# Patient Record
Sex: FEMALE | Race: WHITE | NOT HISPANIC OR LATINO | Employment: FULL TIME | URBAN - METROPOLITAN AREA
[De-identification: names, ages, dates, MRNs, and addresses within clinical notes are randomized per-mention and may not be internally consistent; named-entity substitution may affect disease eponyms.]

---

## 2023-09-04 PROBLEM — R39.15 URINARY URGENCY: Status: ACTIVE | Noted: 2023-09-04

## 2023-09-04 PROBLEM — N39.3 STRESS INCONTINENCE OF URINE: Status: ACTIVE | Noted: 2023-09-04

## 2023-09-04 PROBLEM — M67.88 ACHILLES TENDINOSIS OF LEFT LOWER EXTREMITY: Status: ACTIVE | Noted: 2023-09-04

## 2023-09-04 PROBLEM — M79.673 FOOT PAIN: Status: ACTIVE | Noted: 2023-09-04

## 2023-09-04 PROBLEM — N39.0 CHRONIC UTI: Status: ACTIVE | Noted: 2023-09-04

## 2023-09-04 PROBLEM — S60.559A FOREIGN BODY IN HAND: Status: ACTIVE | Noted: 2023-09-04

## 2023-09-04 PROBLEM — R10.2 PELVIC PAIN IN FEMALE: Status: ACTIVE | Noted: 2023-09-04

## 2023-09-04 PROBLEM — G89.29 CHRONIC LEFT SHOULDER PAIN: Status: ACTIVE | Noted: 2023-09-04

## 2023-09-04 PROBLEM — R30.0 DYSURIA: Status: ACTIVE | Noted: 2023-09-04

## 2023-09-04 PROBLEM — M21.42 ACQUIRED PES PLANUS OF BOTH FEET: Status: ACTIVE | Noted: 2023-09-04

## 2023-09-04 PROBLEM — M21.41 ACQUIRED PES PLANUS OF BOTH FEET: Status: ACTIVE | Noted: 2023-09-04

## 2023-09-04 PROBLEM — A60.00 HERPES, GENITAL: Status: ACTIVE | Noted: 2023-09-04

## 2023-09-04 PROBLEM — B37.9 YEAST INFECTION: Status: ACTIVE | Noted: 2023-09-04

## 2023-09-04 PROBLEM — N91.2 AMENORRHEA: Status: ACTIVE | Noted: 2023-09-04

## 2023-09-04 PROBLEM — N89.8 VAGINAL ITCHING: Status: ACTIVE | Noted: 2023-09-04

## 2023-09-04 PROBLEM — B37.31 VAGINAL CANDIDIASIS: Status: ACTIVE | Noted: 2023-09-04

## 2023-09-04 PROBLEM — M75.00 FROZEN SHOULDER: Status: ACTIVE | Noted: 2023-09-04

## 2023-09-04 PROBLEM — M25.512 CHRONIC LEFT SHOULDER PAIN: Status: ACTIVE | Noted: 2023-09-04

## 2023-09-04 PROBLEM — R63.5 WEIGHT GAIN: Status: ACTIVE | Noted: 2023-09-04

## 2023-09-04 PROBLEM — M79.672 INTRACTABLE LEFT HEEL PAIN: Status: ACTIVE | Noted: 2023-09-04

## 2023-09-04 RX ORDER — NAPROXEN 500 MG/1
TABLET ORAL 2 TIMES DAILY PRN
COMMUNITY
Start: 2022-06-26 | End: 2024-04-09 | Stop reason: WASHOUT

## 2023-09-04 RX ORDER — LIDOCAINE 560 MG/1
PATCH PERCUTANEOUS; TOPICAL; TRANSDERMAL
COMMUNITY
Start: 2022-06-26 | End: 2024-04-09 | Stop reason: WASHOUT

## 2023-09-04 RX ORDER — FAMOTIDINE 20 MG/1
TABLET, FILM COATED ORAL 2 TIMES DAILY
COMMUNITY
Start: 2022-06-26 | End: 2024-04-09 | Stop reason: WASHOUT

## 2023-09-04 RX ORDER — MEDROXYPROGESTERONE ACETATE 10 MG/1
TABLET ORAL
COMMUNITY
Start: 2022-09-09 | End: 2024-04-09 | Stop reason: WASHOUT

## 2023-10-10 ENCOUNTER — APPOINTMENT (OUTPATIENT)
Dept: CARDIOLOGY | Facility: CLINIC | Age: 43
End: 2023-10-10

## 2023-10-12 ENCOUNTER — OFFICE VISIT (OUTPATIENT)
Dept: CARDIOLOGY | Facility: CLINIC | Age: 43
End: 2023-10-12
Payer: COMMERCIAL

## 2023-10-12 VITALS
SYSTOLIC BLOOD PRESSURE: 121 MMHG | HEART RATE: 85 BPM | DIASTOLIC BLOOD PRESSURE: 81 MMHG | BODY MASS INDEX: 38.15 KG/M2 | WEIGHT: 229 LBS | OXYGEN SATURATION: 98 % | HEIGHT: 65 IN

## 2023-10-12 DIAGNOSIS — M79.602 PAIN IN BOTH UPPER EXTREMITIES: Primary | ICD-10-CM

## 2023-10-12 DIAGNOSIS — M79.601 PAIN IN BOTH UPPER EXTREMITIES: Primary | ICD-10-CM

## 2023-10-12 DIAGNOSIS — R29.898 ARM FATIGUE: ICD-10-CM

## 2023-10-12 PROCEDURE — 99214 OFFICE O/P EST MOD 30 MIN: CPT | Performed by: INTERNAL MEDICINE

## 2023-10-12 PROCEDURE — 1036F TOBACCO NON-USER: CPT | Performed by: INTERNAL MEDICINE

## 2023-10-12 ASSESSMENT — ENCOUNTER SYMPTOMS
ENDOCRINE NEGATIVE: 1
LOSS OF SENSATION IN FEET: 0
GASTROINTESTINAL NEGATIVE: 1
CONSTITUTIONAL NEGATIVE: 1
ALLERGIC/IMMUNOLOGIC NEGATIVE: 1
RESPIRATORY NEGATIVE: 1
PSYCHIATRIC NEGATIVE: 1
MUSCULOSKELETAL NEGATIVE: 1
OCCASIONAL FEELINGS OF UNSTEADINESS: 0
NEUROLOGICAL NEGATIVE: 1
HEMATOLOGIC/LYMPHATIC NEGATIVE: 1
DEPRESSION: 0
CARDIOVASCULAR NEGATIVE: 1
EYES NEGATIVE: 1

## 2023-10-12 ASSESSMENT — PAIN SCALES - GENERAL: PAINLEVEL: 0-NO PAIN

## 2023-10-12 ASSESSMENT — PATIENT HEALTH QUESTIONNAIRE - PHQ9
1. LITTLE INTEREST OR PLEASURE IN DOING THINGS: NOT AT ALL
2. FEELING DOWN, DEPRESSED OR HOPELESS: NOT AT ALL
SUM OF ALL RESPONSES TO PHQ9 QUESTIONS 1 AND 2: 0

## 2023-10-12 ASSESSMENT — COLUMBIA-SUICIDE SEVERITY RATING SCALE - C-SSRS
1. IN THE PAST MONTH, HAVE YOU WISHED YOU WERE DEAD OR WISHED YOU COULD GO TO SLEEP AND NOT WAKE UP?: NO
2. HAVE YOU ACTUALLY HAD ANY THOUGHTS OF KILLING YOURSELF?: NO
6. HAVE YOU EVER DONE ANYTHING, STARTED TO DO ANYTHING, OR PREPARED TO DO ANYTHING TO END YOUR LIFE?: NO

## 2023-10-12 NOTE — PROGRESS NOTES
Preventive Cardiology Clinic Note    Reason for Visit: Arm heaviness and arm pain  Referring Clinician: No ref. provider found     History of Present Illness: Jeanie Mustafa is a 43 y.o. female who presents for arm heaviness and arm pain.  Over the past several weeks she has noted fatigue in her arms and heaviness associated with a tingling in her hands and heaviness and pain in her upper back when lifting her arms to fold laundry or moving her arms above her head or in a side to side motion when walking.  She does not have any chest pain, shortness of breath, palpitations, or syncope.  Her arms are not heavy or fatigued when she is walking with them at her side but rather when she is swinging them high near her chest.  She is a non-smoker.  She does not have a family history of premature CAD.  She did go to the emergency department in June with palpitations and high-sensitivity troponin testing and BNP testing were normal.  She also had a normal ECG at that time.  She does not have any symptoms when going upstairs or doing other exertional activities only when she is lifting her arms above her head for a prolonged period of time or moving them vigorously.    Past Medical History:  History of lymphedema and venous insufficiency    Past Surgical History:  History of saphenous vein ablation    Social History:  She reports that she has never smoked. She has never used smokeless tobacco. She reports that she does not drink alcohol and does not use drugs.    Family History:  Family History   Problem Relation Name Age of Onset    Hypothyroidism Mother      Other (heart palpitations) Mother      Diabetes Father      Hyperlipidemia Father      Hypertension Father         Allergies:  Patient has no known allergies.    Outpatient Medications:  Current Outpatient Medications   Medication Instructions    famotidine (Pepcid) 20 mg tablet oral, 2 times daily    lidocaine 4 % patch Topical    medroxyPROGESTERone (Provera) 10 mg  "tablet oral    naproxen (Naprosyn) 500 mg tablet oral, 2 times daily PRN       Review of Systems:  Review of Systems   Constitutional: Negative.   HENT: Negative.     Eyes: Negative.    Cardiovascular: Negative.    Respiratory: Negative.     Endocrine: Negative.    Hematologic/Lymphatic: Negative.    Skin: Negative.    Musculoskeletal: Negative.    Gastrointestinal: Negative.    Genitourinary: Negative.    Neurological: Negative.    Psychiatric/Behavioral: Negative.     Allergic/Immunologic: Negative.        Last Recorded Vitals:  Vitals:    10/12/23 1311   BP: 121/81   BP Location: Right arm   Patient Position: Sitting   BP Cuff Size: Large adult   Pulse: 85   SpO2: 98%   Weight: 104 kg (229 lb)   Height: 1.651 m (5' 5\")       Physical Examination:  General: Well appearing, well-nourished, in no acute distress.  HEENT: Normocephalic atraumatic, pupils equal and reactive to light, extraocular muscles intact, no conjunctival injection, oropharynx clear without exudates.  Neck: Normal carotid arterial pulses, no arterial bruits, no thyromegaly.  Cardiac: Regular rhythm and normal heart rate.  S1, S2 present and normal.  No murmurs, rubs, or gallops.  PMI is nondisplaced. Jugular venous pulsations are normal.  Pulmonary: Normal breath sounds, no increased work of breathing, no wheezes or crackles.  GI: Normal bowel sounds, abdominal aorta not enlarged, no hepatosplenomegaly, no abdominal bruits.  Lower extremities: No cyanosis, clubbing, or edema.  No xanthelasma present. Normal distal pulses.  Skin: Skin intact. No significant rashes or lesions present.  Neuro: Alert and oriented x 3, normal attention and cognition, no focal motor or sensory neurologic deficits.  Psych: Normal affect and mood.  Musculoskeletal: Normal gait normal muscle tone.  Pain in the arms/upper chest is reproducible to palpation and rotation of the shoulders.    Laboratory Studies:  Lab Results   Component Value Date    GLUCOSE 102 (H) 06/26/2022 " "   CALCIUM 9.4 06/26/2022     06/26/2022    K 3.6 06/26/2022    CO2 27 06/26/2022     06/26/2022    BUN 10 06/26/2022    CREATININE 0.74 06/26/2022     Lab Results   Component Value Date    ALT 10 06/26/2022    AST 14 06/26/2022    ALKPHOS 71 06/26/2022    BILITOT 0.8 06/26/2022     Lab Results   Component Value Date    CHOL 182 01/12/2022     Lab Results   Component Value Date    HDL 79.0 01/12/2022     No results found for: \"LDLCALC\"  Lab Results   Component Value Date    TRIG 42 01/12/2022     No components found for: \"CHOLHDL\"  Lab Results   Component Value Date    HGBA1C 5.0 02/20/2018     BNP 6/20/2022 is normal at 8    High-sensitivity troponin 6/20/2022 is normal less than 3    Cardiology Tests:  ECG:  ECG from May 19,023 shows normal sinus rhythm, normal ECG.    Assessment and Plan:  Problem List Items Addressed This Visit    None  Visit Diagnoses       Pain in both upper extremities    -  Primary    Arm fatigue              Jeanie Mustafa is a 43 y.o. female who presents for arm heaviness and arm pain.  By history and physical examination her symptoms are most likely musculoskeletal/neurologic in nature and may be related to brachial plexus injury or impingement.  Her prior cardiovascular testing is normal and her risk for ASCVD is low.  I do not recommend any further cardiovascular work-up at this time for her current symptoms.  I did  her on expected management and recommend that she discuss with her primary care physician any further testing that might be appropriate.  I will be happy to see her back should any new cardiovascular issues arise.  Please do not hesitate to call or contact me with questions or concerns.    Pedro Arriola MD, FAHA, [facility]  Director,  [facility]  Director,  [facility]  Associate Professor of Medicine  [employer]      "

## 2023-11-29 ENCOUNTER — APPOINTMENT (OUTPATIENT)
Dept: CARDIOLOGY | Facility: CLINIC | Age: 43
End: 2023-11-29
Payer: COMMERCIAL

## 2024-03-04 ENCOUNTER — OFFICE VISIT (OUTPATIENT)
Dept: OBSTETRICS AND GYNECOLOGY | Facility: CLINIC | Age: 44
End: 2024-03-04
Payer: COMMERCIAL

## 2024-03-04 VITALS
HEIGHT: 65 IN | BODY MASS INDEX: 38.2 KG/M2 | SYSTOLIC BLOOD PRESSURE: 122 MMHG | DIASTOLIC BLOOD PRESSURE: 70 MMHG | WEIGHT: 229.25 LBS

## 2024-03-04 DIAGNOSIS — R10.2 PELVIC PAIN: Primary | ICD-10-CM

## 2024-03-04 LAB
APPEARANCE UR: CLEAR
BILIRUB UR STRIP.AUTO-MCNC: NEGATIVE MG/DL
COLOR UR: YELLOW
GLUCOSE UR STRIP.AUTO-MCNC: NEGATIVE MG/DL
KETONES UR STRIP.AUTO-MCNC: ABNORMAL MG/DL
LEUKOCYTE ESTERASE UR QL STRIP.AUTO: NEGATIVE
NITRITE UR QL STRIP.AUTO: NEGATIVE
PH UR STRIP.AUTO: 6 [PH]
PROT UR STRIP.AUTO-MCNC: NEGATIVE MG/DL
RBC # UR STRIP.AUTO: NEGATIVE /UL
SP GR UR STRIP.AUTO: 1.03
UROBILINOGEN UR STRIP.AUTO-MCNC: <2 MG/DL

## 2024-03-04 PROCEDURE — 99214 OFFICE O/P EST MOD 30 MIN: CPT | Performed by: OBSTETRICS & GYNECOLOGY

## 2024-03-04 PROCEDURE — 81003 URINALYSIS AUTO W/O SCOPE: CPT

## 2024-03-04 PROCEDURE — 87086 URINE CULTURE/COLONY COUNT: CPT

## 2024-03-04 PROCEDURE — 1036F TOBACCO NON-USER: CPT | Performed by: OBSTETRICS & GYNECOLOGY

## 2024-03-04 NOTE — PROGRESS NOTES
Subjective   Patient ID: Jeanie Mustafa is a 43 y.o. female who presents for Vaginal Discharge. Patient has also noticed a few weeks of lower abdominal pains and today vaginal odor. She has not been sexually active.    She thought her sx were from her recent colonoscopy and hemorroid surgery.   Had no constipation or diarrhea   Has no dysuria   Early menopause at age 39 .  No bleeding. No menses for years.         Review of Systems  Neg   Objective   Physical Exam  Physical Exam  Constitutional:       Appearance: Normal appearance.     Abdominal:    Abdomen is flat. Soft with no masses, non tender       Genitourinary:     Labia:  No lesions  and No rash.       Urethra: No urethral lesion.      Bladder: no  prolapse     Vagina: Normal mucosa, pink and no discharge.     Cervix: Normal. Small w no lesions      Uterus:    Small. Non tender. No masses.      Adnexa:  Bilaterally with no mass or tenderness.                 Assessment/Plan   Diagnoses and all orders for this visit:  Pelvic pain  -     US pelvis transvaginal; Future  -     Urinalysis with Reflex Microscopic  -     Urine culture    MD Bety Lozada, [facility] 03/04/24 3:09 PM

## 2024-03-05 LAB — BACTERIA UR CULT: NORMAL

## 2024-03-16 ENCOUNTER — HOSPITAL ENCOUNTER (OUTPATIENT)
Dept: RADIOLOGY | Facility: HOSPITAL | Age: 44
Discharge: HOME | End: 2024-03-16
Payer: COMMERCIAL

## 2024-03-16 DIAGNOSIS — R10.2 PELVIC PAIN: ICD-10-CM

## 2024-03-16 PROCEDURE — 76830 TRANSVAGINAL US NON-OB: CPT | Performed by: RADIOLOGY

## 2024-03-16 PROCEDURE — 76856 US EXAM PELVIC COMPLETE: CPT | Performed by: RADIOLOGY

## 2024-03-16 PROCEDURE — 76830 TRANSVAGINAL US NON-OB: CPT

## 2024-03-18 ENCOUNTER — TELEPHONE (OUTPATIENT)
Dept: OBSTETRICS AND GYNECOLOGY | Facility: CLINIC | Age: 44
End: 2024-03-18
Payer: COMMERCIAL

## 2024-03-21 ENCOUNTER — TELEPHONE (OUTPATIENT)
Dept: OBSTETRICS AND GYNECOLOGY | Facility: CLINIC | Age: 44
End: 2024-03-21
Payer: COMMERCIAL

## 2024-03-21 NOTE — TELEPHONE ENCOUNTER
Telephone call    Spoke to patient about her recent ultrasound results showing a thickened endometrium.  She states she has not had a period for about 3 years at age 43    At this point I would recommend an office appointment for an endometrial biopsy to make sure there is no abnormal cells in the lining of her uterus.  Patient agrees and appointment will be scheduled    Ana Gonsalves MD

## 2024-03-21 NOTE — TELEPHONE ENCOUNTER
----- Message from Ana Gonsalves MD sent at 3/21/2024 10:38 AM EDT -----  Needs appt for endometrial biopsy     Pt aware reason is thickened endometrial lining.    Ana Gonsalves MD

## 2024-04-09 ENCOUNTER — PROCEDURE VISIT (OUTPATIENT)
Dept: OBSTETRICS AND GYNECOLOGY | Facility: CLINIC | Age: 44
End: 2024-04-09
Payer: COMMERCIAL

## 2024-04-09 VITALS
BODY MASS INDEX: 37.57 KG/M2 | DIASTOLIC BLOOD PRESSURE: 78 MMHG | HEIGHT: 65 IN | SYSTOLIC BLOOD PRESSURE: 122 MMHG | WEIGHT: 225.5 LBS

## 2024-04-09 DIAGNOSIS — R93.89 ENDOMETRIAL THICKENING ON ULTRASOUND: ICD-10-CM

## 2024-04-09 DIAGNOSIS — E28.319 PREMATURE MENOPAUSE: Primary | ICD-10-CM

## 2024-04-09 LAB — PREGNANCY TEST URINE, POC: NEGATIVE

## 2024-04-09 PROCEDURE — 88305 TISSUE EXAM BY PATHOLOGIST: CPT

## 2024-04-09 PROCEDURE — 88305 TISSUE EXAM BY PATHOLOGIST: CPT | Performed by: PATHOLOGY

## 2024-04-09 PROCEDURE — 58100 BIOPSY OF UTERUS LINING: CPT | Performed by: OBSTETRICS & GYNECOLOGY

## 2024-04-09 PROCEDURE — 99213 OFFICE O/P EST LOW 20 MIN: CPT | Performed by: OBSTETRICS & GYNECOLOGY

## 2024-04-09 PROCEDURE — 81025 URINE PREGNANCY TEST: CPT | Performed by: OBSTETRICS & GYNECOLOGY

## 2024-04-09 RX ORDER — NORETHINDRONE ACETATE AND ETHINYL ESTRADIOL .5; 2.5 MG/1; UG/1
1 TABLET ORAL DAILY
Qty: 30 TABLET | Refills: 11 | Status: SHIPPED | OUTPATIENT
Start: 2024-04-09

## 2024-04-09 ASSESSMENT — ENCOUNTER SYMPTOMS
FEVER: 0
BACK PAIN: 1
HEADACHES: 1
HEMATURIA: 0
CONSTIPATION: 0
SORE THROAT: 0
DYSURIA: 0
CHILLS: 1
FLANK PAIN: 1
VOMITING: 0
ANOREXIA: 0
ABDOMINAL PAIN: 1
DIARRHEA: 0
NAUSEA: 0
FREQUENCY: 1

## 2024-04-09 NOTE — PROGRESS NOTES
Patient ID: Jeanie Mustafa is a 43 y.o. female.  Premature menopause at age 40 .     Had US with 7 mm endometrial stripe.   Normal ovaries.   Agrees to EMB   Endometrial biopsy    Date/Time: 4/9/2024 2:36 PM    Performed by: Ana Gonsalves MD  Authorized by: Ana Gonsalves MD    Consent:     Consent obtained: written and verbal    Consent given by: patient    Risks discussed: bleeding    Alternatives discussed: observation    Patient agrees, verbalizes understanding, and wants to proceed: yes    Indications:     Indications: thickened endometrium    Pre-procedure:     Urine pregnancy test: negative    Procedure:     A bimanual exam was performed: yes      Uterus size: non-gravid    Uterus position: midposition    Prepped with: Betadine    Tenaculum used: yes      Cervix dilated: no      Number of passes: 1  Findings:     Cervix: normal      Uterus depth by sound (cm): 7    Specimen collected: specimen collected and sent to pathology      Patient tolerance: tolerated well, no immediate complications    Discussed premature menopause and risks : bone loss long term.  Agrees to start low dose HRT .    RX sent Femhrt     Ana Gonsalves MD

## 2024-04-15 LAB
LABORATORY COMMENT REPORT: NORMAL
PATH REPORT.FINAL DX SPEC: NORMAL
PATH REPORT.GROSS SPEC: NORMAL
PATH REPORT.RELEVANT HX SPEC: NORMAL
PATH REPORT.TOTAL CANCER: NORMAL

## 2024-05-07 ENCOUNTER — APPOINTMENT (OUTPATIENT)
Dept: CARDIOLOGY | Facility: CLINIC | Age: 44
End: 2024-05-07
Payer: COMMERCIAL

## 2024-07-10 ENCOUNTER — OFFICE VISIT (OUTPATIENT)
Dept: OBSTETRICS AND GYNECOLOGY | Facility: CLINIC | Age: 44
End: 2024-07-10
Payer: COMMERCIAL

## 2024-07-10 VITALS
HEIGHT: 65 IN | BODY MASS INDEX: 34.24 KG/M2 | WEIGHT: 205.5 LBS | DIASTOLIC BLOOD PRESSURE: 78 MMHG | SYSTOLIC BLOOD PRESSURE: 110 MMHG

## 2024-07-10 DIAGNOSIS — R93.5 ABNORMAL ULTRASOUND OF ENDOMETRIUM: ICD-10-CM

## 2024-07-10 DIAGNOSIS — N93.9 ABNORMAL UTERINE BLEEDING (AUB): Primary | ICD-10-CM

## 2024-07-10 PROCEDURE — 1036F TOBACCO NON-USER: CPT | Performed by: OBSTETRICS & GYNECOLOGY

## 2024-07-10 PROCEDURE — 99203 OFFICE O/P NEW LOW 30 MIN: CPT | Performed by: OBSTETRICS & GYNECOLOGY

## 2024-07-10 NOTE — PROGRESS NOTES
"GYN PROGRESS NOTE          CC:     Chief Complaint   Patient presents with    Vaginal Bleeding     Est patient - states she has a full period - started on Friday - states had a procedure with , which sounds EMB for thicken endometrium, couple months ago.        HPI:  Jeanie Mustafa is scheduled today for evaluation of postmenopausal AUB.  Patient is postmenopausal as of 2 years ago.  She had episode of postmenopausal bleeding and had an ultrasound of thyroid and thickened endometrium and then she had a normal EMB done by Dr. Herrera on April night.  After that she did not have any bleeding until Friday when she started what appears to be a \"full-blow period\", although she has not had a period for 2 years.  No pain c/o.  Bleeding is vaginal, not GI or urologic in origin--- although she has a history of some intermittent spotting with BNs due to irritation from anal fissures..  She is not on any hormonal Rx (she did not start the FemHRT Rx as prescribed by Dr. Herrera or blood thinners.  She has no history of HGSIL.  Relates that she has a history of multiple endometrial polyps in 2014 that were removed in the office.      ROS:  URO - no hematuria  GI - no blood in BMs  GYN - see HPI        HISTORY:  Past Surgical History:   Procedure Laterality Date    OTHER SURGICAL HISTORY  02/10/2021    Cholecystectomy    OTHER SURGICAL HISTORY  01/12/2022    Tubal ligation     Cancer-related family history is not on file.       PHYSICAL EXAM:  /78 (BP Location: Left arm, Patient Position: Sitting)   Ht 1.651 m (5' 5\")   Wt 93.2 kg (205 lb 8 oz)   BMI 34.20 kg/m²   GEN:  A&O, NAD  HEENT:  head HC/AT, no visible goiter  DERM:  no hirsutism or acne  PSYCH:  normal affect, non-anxious      LAB RESULTS:  4/9/24 - EMB wnl ([place] proliferative endometrium)  1/25/21 - pap/HPV wnl      IMAGING RESULTS:    03/16/24 - US PELVIS TRANSVAGINAL    - Impression -  1.  7 mm endometrial thickness which is abnormally thickened " in a  postmenopausal female. Further gynecologic workup is advised.  2. 1.5 x 1.4 cm O rads 2 (likely benign) lesion of the right ovary.    **I personally reviewed the pelvic ultrasound images and my impressions are thickened endometrium 7 mm with hyperechoic area at posterior aspect consistent with polyp, 1.5 cm right likely paratubal or paraovarian cyst, no free fluid, normal LT ovary      IMPRESSION/PLAN:    Problem List Items Addressed This Visit    None  Visit Diagnoses       Abnormal uterine bleeding (AUB)    -  Primary    Abnormal ultrasound of endometrium              AUB/PMB:  Potential causes for PMB discussed--suspect endometrial polyps based on my review of the ultrasound images.  Prior EMB wnl, Recommend office hysteroscopy for further evaluation due to persistent PMB symptoms and due to suspected endometrial polyp, and if an endometrial polyp is found we will remove it at the time of the office hysteroscopy.  Pap and HPV wnl  in 2021.  Endometrial hyperplasia/cancer risk factors include obesity (BMI=34).  Given that this is the patient's second episode of endometrial polyps offered concurrent Mirena IUD insertion at time of office hysteroscopy due to insurance lapsing at the end the month, handout provided and patient will consider this.        F/U in the office for office hysteroscopy with endometrial sampling with possible Mirena IUD insertion.      Carlos Washington, DO

## 2024-07-17 ENCOUNTER — APPOINTMENT (OUTPATIENT)
Dept: OBSTETRICS AND GYNECOLOGY | Facility: CLINIC | Age: 44
End: 2024-07-17
Payer: COMMERCIAL

## 2024-07-17 VITALS
HEIGHT: 65 IN | WEIGHT: 204 LBS | BODY MASS INDEX: 33.99 KG/M2 | DIASTOLIC BLOOD PRESSURE: 70 MMHG | SYSTOLIC BLOOD PRESSURE: 110 MMHG

## 2024-07-17 DIAGNOSIS — R93.5 ABNORMAL ULTRASOUND OF ENDOMETRIUM: Primary | ICD-10-CM

## 2024-07-17 DIAGNOSIS — N84.0 ENDOMETRIAL POLYP: ICD-10-CM

## 2024-07-17 DIAGNOSIS — N93.9 ABNORMAL UTERINE BLEEDING (AUB): ICD-10-CM

## 2024-07-17 PROBLEM — B37.9 YEAST INFECTION: Status: RESOLVED | Noted: 2023-09-04 | Resolved: 2024-07-17

## 2024-07-17 PROBLEM — B37.31 VAGINAL CANDIDIASIS: Status: RESOLVED | Noted: 2023-09-04 | Resolved: 2024-07-17

## 2024-07-17 PROBLEM — R10.2 PELVIC PAIN IN FEMALE: Status: RESOLVED | Noted: 2023-09-04 | Resolved: 2024-07-17

## 2024-07-17 PROBLEM — N91.2 AMENORRHEA: Status: RESOLVED | Noted: 2023-09-04 | Resolved: 2024-07-17

## 2024-07-17 PROBLEM — N89.8 VAGINAL ITCHING: Status: RESOLVED | Noted: 2023-09-04 | Resolved: 2024-07-17

## 2024-07-17 LAB — PREGNANCY TEST URINE, POC: NEGATIVE

## 2024-07-17 PROCEDURE — 88305 TISSUE EXAM BY PATHOLOGIST: CPT

## 2024-07-17 PROCEDURE — 58558 HYSTEROSCOPY BIOPSY: CPT | Performed by: OBSTETRICS & GYNECOLOGY

## 2024-07-17 PROCEDURE — 81025 URINE PREGNANCY TEST: CPT | Performed by: OBSTETRICS & GYNECOLOGY

## 2024-07-17 RX ORDER — BUPIVACAINE HYDROCHLORIDE 2.5 MG/ML
10 INJECTION, SOLUTION INFILTRATION; PERINEURAL ONCE
Status: COMPLETED | OUTPATIENT
Start: 2024-07-17 | End: 2024-07-17

## 2024-07-17 NOTE — PROGRESS NOTES
Patient ID: Jeanie Mustafa is a 44 y.o. female for office hysteroscopy with possible polypectomy for AUB.  Currently bleeding, having some cramping.      Procedures    HYSTEROSCOPY WITH EMB/POLYPECTOMY OFFICE PROCEDURE NOTE    Patient's pre-procedure questions were answered and a written informed consent form was signed.   Patient was placed in lithotomy position on the procedure room table.  A speculum was placed in the vagina. The cervix was cleansed × 3 with Betadine.  A single-tooth tenaculum was placed on the anterior lip of the cervix.  A paracervical block of 10 mL of 0.25% marcaine was placed.   The [employer] hysteroscope was then inserted into the endometrial cavity, normal saline was used for the hysteroscopic fluid medium.  The cavity distended well with evidence of endometrial polypoid looking tissue.  The background endometrium appeared normal/fairly atrophic.  The resectoscope was placed through the hysteroscope and the polyp-appearing tissue was then removed, as was background endometrial tissue. Procedure was then ended, the instruments removed from the uterus and vagina. The tenaculum site was hemostatic. The patient tolerated the procedure well.  Blood loss was minimal.      Problem List Items Addressed This Visit    None  Visit Diagnoses       Abnormal ultrasound of endometrium    -  Primary    Abnormal uterine bleeding (AUB)        Relevant Orders    POCT pregnancy, urine manually resulted (Completed)    Endometrial polyp              AUB/PMB:  Blind EMB wnl 4/2024.  Office hysteroscopy done today with polypectomy without complication, postprocedure instructions reviewed.  If any recurrent PMB after this would recommend hysterectomy.  Patient was offered but declines a Mirena IUD today.  AUB precautions reviewed.        Office will call with PATH results.        F/U PRN.      Carlos Washington DO

## 2024-07-29 ENCOUNTER — TELEPHONE (OUTPATIENT)
Dept: OBSTETRICS AND GYNECOLOGY | Facility: CLINIC | Age: 44
End: 2024-07-29
Payer: COMMERCIAL

## 2024-07-29 NOTE — TELEPHONE ENCOUNTER
----- Message from Carlos Washington sent at 7/28/2024  3:01 PM EDT -----  Regarding: path results  Call patient--PATH results benign from endometrial sampling.  (We didn't schedule her a F/U results with me because she was losing her insurance at the end of July).

## 2024-07-30 NOTE — TELEPHONE ENCOUNTER
Spoke with patient and made her aware of the results.  Reviewed and approved by MARISELA DILLON on 7/30/24 at 12:52 PM.

## 2024-08-08 ENCOUNTER — APPOINTMENT (OUTPATIENT)
Dept: OBSTETRICS AND GYNECOLOGY | Facility: CLINIC | Age: 44
End: 2024-08-08
Payer: COMMERCIAL